# Patient Record
Sex: MALE
[De-identification: names, ages, dates, MRNs, and addresses within clinical notes are randomized per-mention and may not be internally consistent; named-entity substitution may affect disease eponyms.]

---

## 2018-01-14 ENCOUNTER — HOSPITAL ENCOUNTER (EMERGENCY)
Dept: HOSPITAL 14 - H.ER | Age: 23
Discharge: HOME | End: 2018-01-14
Payer: SELF-PAY

## 2018-01-14 VITALS
RESPIRATION RATE: 19 BRPM | DIASTOLIC BLOOD PRESSURE: 78 MMHG | OXYGEN SATURATION: 99 % | SYSTOLIC BLOOD PRESSURE: 140 MMHG | HEART RATE: 109 BPM | TEMPERATURE: 97 F

## 2018-01-14 DIAGNOSIS — F10.129: Primary | ICD-10-CM

## 2018-01-14 LAB
ALBUMIN SERPL-MCNC: 4.8 G/DL (ref 3.5–5)
ALBUMIN/GLOB SERPL: 1.5 {RATIO} (ref 1–2.1)
ALT SERPL-CCNC: 70 U/L (ref 21–72)
AST SERPL-CCNC: 41 U/L (ref 17–59)
BASOPHILS # BLD AUTO: 0.1 K/UL (ref 0–0.2)
BASOPHILS NFR BLD: 0.6 % (ref 0–2)
BUN SERPL-MCNC: 12 MG/DL (ref 9–20)
CALCIUM SERPL-MCNC: 9.7 MG/DL (ref 8.4–10.2)
EOSINOPHIL # BLD AUTO: 0 K/UL (ref 0–0.7)
EOSINOPHIL NFR BLD: 0.3 % (ref 0–4)
ERYTHROCYTE [DISTWIDTH] IN BLOOD BY AUTOMATED COUNT: 13.1 % (ref 11.5–14.5)
GFR NON-AFRICAN AMERICAN: > 60
HGB BLD-MCNC: 16.5 G/DL (ref 12–18)
LYMPHOCYTES # BLD AUTO: 2.4 K/UL (ref 1–4.3)
LYMPHOCYTES NFR BLD AUTO: 26.1 % (ref 20–40)
MCH RBC QN AUTO: 29.9 PG (ref 27–31)
MCHC RBC AUTO-ENTMCNC: 34.9 G/DL (ref 33–37)
MCV RBC AUTO: 85.8 FL (ref 80–94)
MONOCYTES # BLD: 0.7 K/UL (ref 0–0.8)
MONOCYTES NFR BLD: 7.4 % (ref 0–10)
NEUTROPHILS # BLD: 6 K/UL (ref 1.8–7)
NEUTROPHILS NFR BLD AUTO: 65.6 % (ref 50–75)
NRBC BLD AUTO-RTO: 0.1 % (ref 0–0)
PLATELET # BLD: 251 K/UL (ref 130–400)
PMV BLD AUTO: 7.6 FL (ref 7.2–11.7)
RBC # BLD AUTO: 5.5 MIL/UL (ref 4.4–5.9)
WBC # BLD AUTO: 9.1 K/UL (ref 4.8–10.8)

## 2018-01-14 PROCEDURE — 82948 REAGENT STRIP/BLOOD GLUCOSE: CPT

## 2018-01-14 PROCEDURE — 85025 COMPLETE CBC W/AUTO DIFF WBC: CPT

## 2018-01-14 PROCEDURE — 80053 COMPREHEN METABOLIC PANEL: CPT

## 2018-01-14 PROCEDURE — 99283 EMERGENCY DEPT VISIT LOW MDM: CPT

## 2018-01-14 NOTE — ED PDOC
HPI: Psych/Substance Abuse


Time Seen by Provider: 18 20:01


Chief Complaint (Nursing): Alcohol Ingestion


Chief Complaint (Provider): Alcool


History Per: Patient


History/Exam Limitations: no limitations


Onset/Duration Of Symptoms: Hrs


Current Symptoms Are (Timing): Still Present


Suicide/Self Injury Attempted (Context): None


Modifying Factor(s): Alcohol


Severity: None


Involuntary Hold By: None


Additional Complaint(s): 


22 year old   male brought into the ED by [facility] [employer] and 

[facility] for public intoxication.  Patient offers no medical complaint in 

the ED at this time. 








Past Medical History


Reviewed: Historical Data, Nursing Documentation, Vital Signs


Vital Signs: 





 Last Vital Signs











Temp  97 F L  18 19:48


 


Pulse  109 H  18 19:48


 


Resp  19   18 19:48


 


BP  140/78   18 19:48


 


Pulse Ox  99   18 19:48














- Medical History


PMH: No Chronic Diseases





- Surgical History


Surgical History: No Surg Hx





- Family History


Family History: States: Unknown Family Hx





- Living Arrangements


Living Arrangements: With Family





- Social History


Current smoker - smoking cessation education provided: No


Ex-Smoker (has not smoked in the last 12 months): No


Alcohol: Social


Drugs: Denies





- Allergies


Allergies/Adverse Reactions: 


 Allergies











Allergy/AdvReac Type Severity Reaction Status Date / Time


 


No Known Allergies Allergy   Verified 18 19:54














Review of Systems


ROS Statement: Except As Marked, All Systems Reviewed And Found Negative


Constitutional: Negative for: Fever, Chills


Psych: Positive for: Other (Alcohol Intoxication)





Physical Exam





- Reviewed


Nursing Documentation Reviewed: Yes


Vital Signs Reviewed: Yes





- Physical Exam


Appears: Positive for: Non-toxic, No Acute Distress


Head Exam: Positive for: ATRAUMATIC, NORMAL INSPECTION, NORMOCEPHALIC


Skin: Positive for: Normal Color, Warm, Dry.  Negative for: Rash


Eye Exam: Positive for: Normal appearance, EOMI, PERRL.  Negative for: Nystagmus


ENT: Positive for: Normal ENT Inspection


Neck: Positive for: Normal, Painless ROM, Supple


Cardiovascular/Chest: Positive for: Regular Rate, Rhythm, Chest Non Tender.  

Negative for: Tachycardia


Respiratory: Positive for: Normal Breath Sounds.  Negative for: Rales, Rhonchi, 

Wheezing, Respiratory Distress


Gastrointestinal/Abdominal: Positive for: Normal Exam, Bowel Sounds, Soft.  

Negative for: Tenderness, Guarding, Rebound


Back: Positive for: Normal Inspection.  Negative for: L CVA Tenderness, R CVA 

Tenderness


Extremity: Positive for: Normal ROM, Tenderness.  Negative for: Deformity, 

Swelling


Neurologic/Psych: Positive for: Alert, Gait.  Negative for: Motor/Sensory 

Deficits





- Laboratory Results


Result Diagrams: 


 18 20:24





 18 20:24





- ECG


O2 Sat by Pulse Oximetry: 99 (RA)


Pulse Ox Interpretation: Normal





Medical Decision Making


Medical Decision Makin


Initial Impression


21 y/o female presenting with alcohol intoxication





Initial Plan:


*  Alcohol Serum 


* CMP 


* Drug Screen 


* CBC 


* Accucheck


* Reevaluation








Patient contacted his mother who will be travelling form [place] to take 

him home.








Labs reviewed show no clinically significant abnormalities.





Patient is medically stable and was discharged into the care of is father.








Condition: stable


Diagnosis: Alcohol Intoxication


______________________________________________________________


Documented by Kenia Rivera acting as a scribe for James Pearl MD. 





All medical record entries made by the Scribe were at my direction and 

personally dictated by me. I have reviewed the chart and agree that the record 

accurately reflects my personal performance of the history, physical exam, 

medical decision making, and the department course for this patient. I have 

also personally directed, reviewed, and agree with the discharge instructions 

and disposition.














Disposition





- Clinical Impression


Clinical Impression: 


 Alcohol intoxication








- Patient ED Disposition


Is Patient to be Admitted: No


Counseled Patient/Family Regarding: Studies Performed





- Disposition


Disposition: Routine/Home


Disposition Time: 21:24


Condition: STABLE


Instructions:  Alcohol Intoxication (ED)


Forms:  [facility] (English)